# Patient Record
Sex: FEMALE | Race: WHITE | NOT HISPANIC OR LATINO | Employment: OTHER | URBAN - METROPOLITAN AREA
[De-identification: names, ages, dates, MRNs, and addresses within clinical notes are randomized per-mention and may not be internally consistent; named-entity substitution may affect disease eponyms.]

---

## 2019-02-08 NOTE — PATIENT DISCUSSION
Patient will need to have lid surgery before cataract surgery. When she is healed from lid surgery she will be custom toric Claudine [facility]. Patient advised that she will need to come back in for a decision for surgery and repeat measurements before she has surgery.

## 2019-02-08 NOTE — PATIENT DISCUSSION
The risks, benefits, and alternatives to surgery were discussed. The patient elects to proceed with surgery.

## 2019-02-20 NOTE — PATIENT DISCUSSION
Recommend Bilateral upper lid ptosis repair. Predeterm. Skin/fat to be determined. Skin does not affect MRDs. This will not meet insurance criteria. Skin/fat removal will be cosmetic. (discussed risks and benefits of sx. .. ).

## 2019-07-23 ENCOUNTER — NEW PATIENT EMERGENCY (OUTPATIENT)
Dept: URBAN - METROPOLITAN AREA CLINIC 35 | Facility: CLINIC | Age: 69
End: 2019-07-23

## 2019-07-23 DIAGNOSIS — H34.231: ICD-10-CM

## 2019-07-23 DIAGNOSIS — H40.033: ICD-10-CM

## 2019-07-23 DIAGNOSIS — H43.813: ICD-10-CM

## 2019-07-23 DIAGNOSIS — H25.13: ICD-10-CM

## 2019-07-23 PROCEDURE — 99203 OFFICE O/P NEW LOW 30 MIN: CPT

## 2019-07-23 PROCEDURE — 92250 FUNDUS PHOTOGRAPHY W/I&R: CPT

## 2019-07-23 PROCEDURE — 92134 CPTRZ OPH DX IMG PST SGM RTA: CPT

## 2019-07-23 ASSESSMENT — VISUAL ACUITY
OS_CC: 20/25
OD_CC: 20/25

## 2019-07-23 ASSESSMENT — TONOMETRY
OD_IOP_MMHG: 11
OS_IOP_MMHG: 12

## 2019-07-25 ENCOUNTER — RETINA CONSULT (OUTPATIENT)
Dept: URBAN - METROPOLITAN AREA CLINIC 35 | Facility: CLINIC | Age: 69
End: 2019-07-25

## 2019-07-25 DIAGNOSIS — H43.813: ICD-10-CM

## 2019-07-25 DIAGNOSIS — H35.3132: ICD-10-CM

## 2019-07-25 DIAGNOSIS — G45.3: ICD-10-CM

## 2019-07-25 DIAGNOSIS — H34.231: ICD-10-CM

## 2019-07-25 DIAGNOSIS — H35.363: ICD-10-CM

## 2019-07-25 DIAGNOSIS — H35.723: ICD-10-CM

## 2019-07-25 PROCEDURE — 92235 FLUORESCEIN ANGRPH MLTIFRAME: CPT

## 2019-07-25 PROCEDURE — 92250 FUNDUS PHOTOGRAPHY W/I&R: CPT

## 2019-07-25 PROCEDURE — 92014 COMPRE OPH EXAM EST PT 1/>: CPT

## 2019-07-25 PROCEDURE — 9222550 BILAT EXTENDED OPHTHALMOSCOPY, FIRST

## 2019-07-25 ASSESSMENT — VISUAL ACUITY
OD_SC: 20/25
OS_SC: 20/25

## 2019-07-25 ASSESSMENT — TONOMETRY
OS_IOP_MMHG: 13
OD_IOP_MMHG: 11

## 2019-08-13 ENCOUNTER — ESTABLISHED COMPREHENSIVE EXAM (OUTPATIENT)
Dept: URBAN - METROPOLITAN AREA CLINIC 35 | Facility: CLINIC | Age: 69
End: 2019-08-13

## 2019-08-13 DIAGNOSIS — H35.3132: ICD-10-CM

## 2019-08-13 DIAGNOSIS — H25.13: ICD-10-CM

## 2019-08-13 DIAGNOSIS — H04.123: ICD-10-CM

## 2019-08-13 DIAGNOSIS — H34.231: ICD-10-CM

## 2019-08-13 PROCEDURE — 92015 DETERMINE REFRACTIVE STATE: CPT

## 2019-08-13 PROCEDURE — 92012 INTRM OPH EXAM EST PATIENT: CPT

## 2019-08-13 ASSESSMENT — KERATOMETRY
OS_K1POWER_DIOPTERS: 44
OD_K2POWER_DIOPTERS: 44.75
OS_K2POWER_DIOPTERS: 45
OD_K1POWER_DIOPTERS: 43.75

## 2019-08-13 ASSESSMENT — VISUAL ACUITY
OS_CC: 20/25+1
OD_CC: J2
OD_CC: 20/20
OS_CC: J2

## 2019-08-13 ASSESSMENT — TONOMETRY
OD_IOP_MMHG: 11
OS_IOP_MMHG: 11

## 2019-08-29 ENCOUNTER — ESTABLISHED PATIENT (OUTPATIENT)
Dept: URBAN - METROPOLITAN AREA CLINIC 35 | Facility: CLINIC | Age: 69
End: 2019-08-29

## 2019-08-29 DIAGNOSIS — G45.3: ICD-10-CM

## 2019-08-29 DIAGNOSIS — H35.3132: ICD-10-CM

## 2019-08-29 DIAGNOSIS — H34.231: ICD-10-CM

## 2019-08-29 DIAGNOSIS — H35.363: ICD-10-CM

## 2019-08-29 DIAGNOSIS — H35.723: ICD-10-CM

## 2019-08-29 DIAGNOSIS — H43.813: ICD-10-CM

## 2019-08-29 PROCEDURE — 92012 INTRM OPH EXAM EST PATIENT: CPT

## 2019-08-29 PROCEDURE — 92134 CPTRZ OPH DX IMG PST SGM RTA: CPT

## 2019-08-29 ASSESSMENT — VISUAL ACUITY
OS_SC: 20/40-2
OD_SC: 20/50-1
OD_PH: 20/40
OS_PH: 20/25+2

## 2019-08-29 ASSESSMENT — KERATOMETRY
OS_K1POWER_DIOPTERS: 44
OD_K1POWER_DIOPTERS: 43.75
OS_K2POWER_DIOPTERS: 45
OD_K2POWER_DIOPTERS: 44.75

## 2019-08-29 ASSESSMENT — TONOMETRY
OD_IOP_MMHG: 12
OS_IOP_MMHG: 13

## 2020-01-09 ENCOUNTER — ESTABLISHED COMPREHENSIVE EXAM (OUTPATIENT)
Dept: URBAN - METROPOLITAN AREA CLINIC 35 | Facility: CLINIC | Age: 70
End: 2020-01-09

## 2020-01-09 VITALS — DIASTOLIC BLOOD PRESSURE: 70 MMHG | HEIGHT: 55 IN | SYSTOLIC BLOOD PRESSURE: 132 MMHG

## 2020-01-09 DIAGNOSIS — H35.363: ICD-10-CM

## 2020-01-09 DIAGNOSIS — G45.3: ICD-10-CM

## 2020-01-09 DIAGNOSIS — H35.3132: ICD-10-CM

## 2020-01-09 DIAGNOSIS — H43.813: ICD-10-CM

## 2020-01-09 DIAGNOSIS — H35.033: ICD-10-CM

## 2020-01-09 DIAGNOSIS — H34.231: ICD-10-CM

## 2020-01-09 DIAGNOSIS — H35.723: ICD-10-CM

## 2020-01-09 PROCEDURE — 92235 FLUORESCEIN ANGRPH MLTIFRAME: CPT

## 2020-01-09 PROCEDURE — 92014 COMPRE OPH EXAM EST PT 1/>: CPT

## 2020-01-09 PROCEDURE — 92250 FUNDUS PHOTOGRAPHY W/I&R: CPT

## 2020-01-09 PROCEDURE — 92201 OPSCPY EXTND RTA DRAW UNI/BI: CPT

## 2020-01-09 ASSESSMENT — VISUAL ACUITY
OS_SC: 20/40+2
OD_SC: 20/40

## 2020-01-09 ASSESSMENT — KERATOMETRY
OS_K1POWER_DIOPTERS: 44
OD_K2POWER_DIOPTERS: 44.75
OD_K1POWER_DIOPTERS: 43.75
OS_K2POWER_DIOPTERS: 45

## 2020-01-09 ASSESSMENT — TONOMETRY
OD_IOP_MMHG: 06
OS_IOP_MMHG: 09

## 2020-02-21 ENCOUNTER — TECH ONLY (OUTPATIENT)
Dept: URBAN - METROPOLITAN AREA CLINIC 35 | Facility: CLINIC | Age: 70
End: 2020-02-21

## 2020-02-21 DIAGNOSIS — Z79.899: ICD-10-CM

## 2020-02-21 PROCEDURE — 99211T TECH SERVICE

## 2020-02-21 PROCEDURE — 92083 EXTENDED VISUAL FIELD XM: CPT

## 2020-02-21 ASSESSMENT — KERATOMETRY
OD_K2POWER_DIOPTERS: 44.75
OS_K1POWER_DIOPTERS: 44
OS_K2POWER_DIOPTERS: 45
OD_K1POWER_DIOPTERS: 43.75

## 2020-09-10 ENCOUNTER — ESTABLISHED COMPREHENSIVE EXAM (OUTPATIENT)
Dept: URBAN - METROPOLITAN AREA CLINIC 35 | Facility: CLINIC | Age: 70
End: 2020-09-10

## 2020-09-10 DIAGNOSIS — H35.363: ICD-10-CM

## 2020-09-10 DIAGNOSIS — H35.3132: ICD-10-CM

## 2020-09-10 DIAGNOSIS — H43.813: ICD-10-CM

## 2020-09-10 DIAGNOSIS — H34.231: ICD-10-CM

## 2020-09-10 DIAGNOSIS — H35.723: ICD-10-CM

## 2020-09-10 DIAGNOSIS — H35.033: ICD-10-CM

## 2020-09-10 DIAGNOSIS — Z79.899: ICD-10-CM

## 2020-09-10 PROCEDURE — 92014 COMPRE OPH EXAM EST PT 1/>: CPT

## 2020-09-10 PROCEDURE — 92273 FULL FIELD ERG W/I&R: CPT

## 2020-09-10 PROCEDURE — 92250 FUNDUS PHOTOGRAPHY W/I&R: CPT

## 2020-09-10 ASSESSMENT — VISUAL ACUITY
OS_PH: 20/40-1
OD_SC: 20/40-2
OS_SC: 20/40-2
OD_PH: 20/40+2

## 2020-09-10 ASSESSMENT — TONOMETRY
OS_IOP_MMHG: 10
OD_IOP_MMHG: 11

## 2020-09-10 ASSESSMENT — KERATOMETRY
OD_K2POWER_DIOPTERS: 44.75
OS_K2POWER_DIOPTERS: 45
OD_K1POWER_DIOPTERS: 43.75
OS_K1POWER_DIOPTERS: 44

## 2021-03-12 ENCOUNTER — TECH ONLY (OUTPATIENT)
Dept: URBAN - METROPOLITAN AREA CLINIC 35 | Facility: CLINIC | Age: 71
End: 2021-03-12

## 2021-03-12 DIAGNOSIS — Z79.899: ICD-10-CM

## 2021-03-12 PROCEDURE — 92083 EXTENDED VISUAL FIELD XM: CPT

## 2021-03-12 PROCEDURE — 99211T TECH SERVICE

## 2021-05-06 ENCOUNTER — ESTABLISHED PATIENT (OUTPATIENT)
Dept: URBAN - METROPOLITAN AREA CLINIC 35 | Facility: CLINIC | Age: 71
End: 2021-05-06

## 2021-05-06 DIAGNOSIS — H34.231: ICD-10-CM

## 2021-05-06 DIAGNOSIS — H35.723: ICD-10-CM

## 2021-05-06 DIAGNOSIS — H35.363: ICD-10-CM

## 2021-05-06 DIAGNOSIS — D31.32: ICD-10-CM

## 2021-05-06 DIAGNOSIS — H35.033: ICD-10-CM

## 2021-05-06 DIAGNOSIS — H43.813: ICD-10-CM

## 2021-05-06 DIAGNOSIS — H35.3132: ICD-10-CM

## 2021-05-06 PROCEDURE — 92134 CPTRZ OPH DX IMG PST SGM RTA: CPT

## 2021-05-06 PROCEDURE — 99214 OFFICE O/P EST MOD 30 MIN: CPT

## 2021-05-06 PROCEDURE — 92250 FUNDUS PHOTOGRAPHY W/I&R: CPT

## 2021-05-06 ASSESSMENT — KERATOMETRY
OS_K2POWER_DIOPTERS: 45
OS_K1POWER_DIOPTERS: 44
OD_K2POWER_DIOPTERS: 44.75
OD_K1POWER_DIOPTERS: 43.75

## 2021-05-06 ASSESSMENT — TONOMETRY
OD_IOP_MMHG: 12
OS_IOP_MMHG: 11

## 2021-05-06 ASSESSMENT — VISUAL ACUITY
OD_CC: 20/25+1
OS_CC: 20/25-1

## 2021-06-03 ENCOUNTER — ESTABLISHED PATIENT (OUTPATIENT)
Dept: URBAN - METROPOLITAN AREA CLINIC 35 | Facility: CLINIC | Age: 71
End: 2021-06-03

## 2021-06-03 DIAGNOSIS — H35.723: ICD-10-CM

## 2021-06-03 DIAGNOSIS — H52.03: ICD-10-CM

## 2021-06-03 DIAGNOSIS — D31.32: ICD-10-CM

## 2021-06-03 DIAGNOSIS — H35.363: ICD-10-CM

## 2021-06-03 DIAGNOSIS — H10.13: ICD-10-CM

## 2021-06-03 DIAGNOSIS — H25.13: ICD-10-CM

## 2021-06-03 DIAGNOSIS — H34.231: ICD-10-CM

## 2021-06-03 DIAGNOSIS — Z79.899: ICD-10-CM

## 2021-06-03 DIAGNOSIS — H35.033: ICD-10-CM

## 2021-06-03 DIAGNOSIS — H35.3132: ICD-10-CM

## 2021-06-03 DIAGNOSIS — H43.813: ICD-10-CM

## 2021-06-03 PROCEDURE — 92014 COMPRE OPH EXAM EST PT 1/>: CPT

## 2021-06-03 PROCEDURE — 92015 DETERMINE REFRACTIVE STATE: CPT

## 2021-06-03 ASSESSMENT — VISUAL ACUITY
OD_CC: J1
OS_CC: J1
OS_CC: 20/25
OD_CC: 20/25-1

## 2021-06-03 ASSESSMENT — KERATOMETRY
OS_K2POWER_DIOPTERS: 44.75
OS_K1POWER_DIOPTERS: 43.75
OD_K1POWER_DIOPTERS: 43.75
OD_K2POWER_DIOPTERS: 44.50

## 2021-06-03 ASSESSMENT — TONOMETRY
OS_IOP_MMHG: 11
OD_IOP_MMHG: 11

## 2022-03-24 ASSESSMENT — KERATOMETRY
OD_K2POWER_DIOPTERS: 44.50
OS_K2POWER_DIOPTERS: 44.75
OD_K1POWER_DIOPTERS: 43.75
OS_K1POWER_DIOPTERS: 43.75

## 2022-04-01 ENCOUNTER — TECH ONLY (OUTPATIENT)
Dept: URBAN - METROPOLITAN AREA CLINIC 35 | Facility: CLINIC | Age: 72
End: 2022-04-01

## 2022-04-01 DIAGNOSIS — M32.10: ICD-10-CM

## 2022-04-01 DIAGNOSIS — Z79.899: ICD-10-CM

## 2022-04-01 PROCEDURE — 92083 EXTENDED VISUAL FIELD XM: CPT

## 2022-04-01 PROCEDURE — 99211T TECH SERVICE

## 2022-06-15 ASSESSMENT — KERATOMETRY
OD_K1POWER_DIOPTERS: 43.75
OS_K2POWER_DIOPTERS: 44.75
OS_K1POWER_DIOPTERS: 43.75
OD_K2POWER_DIOPTERS: 44.50

## 2022-06-16 ENCOUNTER — COMPREHENSIVE EXAM (OUTPATIENT)
Dept: URBAN - METROPOLITAN AREA CLINIC 35 | Facility: CLINIC | Age: 72
End: 2022-06-16

## 2022-06-16 DIAGNOSIS — M32.10: ICD-10-CM

## 2022-06-16 DIAGNOSIS — Z79.899: ICD-10-CM

## 2022-06-16 DIAGNOSIS — H25.13: ICD-10-CM

## 2022-06-16 DIAGNOSIS — H35.363: ICD-10-CM

## 2022-06-16 DIAGNOSIS — H43.813: ICD-10-CM

## 2022-06-16 DIAGNOSIS — H52.03: ICD-10-CM

## 2022-06-16 DIAGNOSIS — H35.3132: ICD-10-CM

## 2022-06-16 PROCEDURE — 92014 COMPRE OPH EXAM EST PT 1/>: CPT

## 2022-06-16 PROCEDURE — 92134 CPTRZ OPH DX IMG PST SGM RTA: CPT

## 2022-06-16 PROCEDURE — 92015 DETERMINE REFRACTIVE STATE: CPT

## 2022-06-16 ASSESSMENT — TONOMETRY
OS_IOP_MMHG: 15
OD_IOP_MMHG: 15

## 2022-06-16 ASSESSMENT — VISUAL ACUITY
OU_CC: J1
OD_CC: J3
OS_CC: J3
OS_CC: 20/25-2
OD_CC: 20/25

## 2023-04-12 ASSESSMENT — KERATOMETRY
OS_K2POWER_DIOPTERS: 44.75
OD_K2POWER_DIOPTERS: 44.50
OS_K1POWER_DIOPTERS: 43.75
OD_K1POWER_DIOPTERS: 43.75

## 2023-04-21 ENCOUNTER — TECH ONLY (OUTPATIENT)
Dept: URBAN - METROPOLITAN AREA CLINIC 35 | Facility: CLINIC | Age: 73
End: 2023-04-21

## 2023-04-21 DIAGNOSIS — Z79.899: ICD-10-CM

## 2023-04-21 DIAGNOSIS — M32.10: ICD-10-CM

## 2023-04-21 PROCEDURE — 92083 EXTENDED VISUAL FIELD XM: CPT

## 2023-04-21 PROCEDURE — 99211T TECH SERVICE

## 2023-10-31 ENCOUNTER — COMPREHENSIVE EXAM (OUTPATIENT)
Dept: URBAN - METROPOLITAN AREA CLINIC 35 | Facility: CLINIC | Age: 73
End: 2023-10-31

## 2023-10-31 DIAGNOSIS — H04.123: ICD-10-CM

## 2023-10-31 DIAGNOSIS — H35.033: ICD-10-CM

## 2023-10-31 DIAGNOSIS — H34.231: ICD-10-CM

## 2023-10-31 DIAGNOSIS — H35.363: ICD-10-CM

## 2023-10-31 DIAGNOSIS — H35.3132: ICD-10-CM

## 2023-10-31 DIAGNOSIS — H25.13: ICD-10-CM

## 2023-10-31 DIAGNOSIS — M32.10: ICD-10-CM

## 2023-10-31 DIAGNOSIS — H43.813: ICD-10-CM

## 2023-10-31 DIAGNOSIS — Z79.899: ICD-10-CM

## 2023-10-31 DIAGNOSIS — H40.033: ICD-10-CM

## 2023-10-31 DIAGNOSIS — H52.7: ICD-10-CM

## 2023-10-31 DIAGNOSIS — H35.30: ICD-10-CM

## 2023-10-31 PROCEDURE — 92015 DETERMINE REFRACTIVE STATE: CPT

## 2023-10-31 PROCEDURE — 92250 FUNDUS PHOTOGRAPHY W/I&R: CPT

## 2023-10-31 PROCEDURE — 99214 OFFICE O/P EST MOD 30 MIN: CPT

## 2023-10-31 ASSESSMENT — VISUAL ACUITY
OS_CC: 20/20-2
OS_CC: J1
OU_CC: J1
OD_CC: J2
OD_CC: 20/40-2
OD_PH: 20/25+1
OU_CC: 20/20-1

## 2023-10-31 ASSESSMENT — TONOMETRY
OD_IOP_MMHG: 14
OS_IOP_MMHG: 14

## 2024-05-03 ENCOUNTER — TECH ONLY (OUTPATIENT)
Dept: URBAN - METROPOLITAN AREA CLINIC 35 | Facility: CLINIC | Age: 74
End: 2024-05-03

## 2024-05-03 DIAGNOSIS — Z79.899: ICD-10-CM

## 2024-05-03 DIAGNOSIS — H52.7: ICD-10-CM

## 2024-05-03 PROCEDURE — 92083 EXTENDED VISUAL FIELD XM: CPT

## 2024-05-03 PROCEDURE — 99211T TECH SERVICE

## 2025-03-05 ENCOUNTER — COMPREHENSIVE EXAM (OUTPATIENT)
Age: 75
End: 2025-03-05

## 2025-03-05 DIAGNOSIS — H35.363: ICD-10-CM

## 2025-03-05 DIAGNOSIS — H04.123: ICD-10-CM

## 2025-03-05 DIAGNOSIS — H43.813: ICD-10-CM

## 2025-03-05 DIAGNOSIS — H34.231: ICD-10-CM

## 2025-03-05 DIAGNOSIS — H35.30: ICD-10-CM

## 2025-03-05 DIAGNOSIS — H52.7: ICD-10-CM

## 2025-03-05 DIAGNOSIS — Z79.899: ICD-10-CM

## 2025-03-05 DIAGNOSIS — H40.033: ICD-10-CM

## 2025-03-05 DIAGNOSIS — H25.13: ICD-10-CM

## 2025-03-05 DIAGNOSIS — H35.033: ICD-10-CM

## 2025-03-05 DIAGNOSIS — M32.10: ICD-10-CM

## 2025-03-05 DIAGNOSIS — H35.3132: ICD-10-CM

## 2025-03-05 PROCEDURE — 92134 CPTRZ OPH DX IMG PST SGM RTA: CPT

## 2025-03-05 PROCEDURE — 92015 DETERMINE REFRACTIVE STATE: CPT

## 2025-03-05 PROCEDURE — 99214 OFFICE O/P EST MOD 30 MIN: CPT

## 2025-03-05 PROCEDURE — 92250 FUNDUS PHOTOGRAPHY W/I&R: CPT

## 2025-06-03 ENCOUNTER — TECH ONLY (OUTPATIENT)
Age: 75
End: 2025-06-03

## 2025-06-03 DIAGNOSIS — H34.231: ICD-10-CM

## 2025-06-03 DIAGNOSIS — H52.7: ICD-10-CM

## 2025-06-03 DIAGNOSIS — M32.10: ICD-10-CM

## 2025-06-03 DIAGNOSIS — Z79.899: ICD-10-CM

## 2025-06-03 DIAGNOSIS — H25.13: ICD-10-CM

## 2025-06-03 DIAGNOSIS — H35.3132: ICD-10-CM

## 2025-06-03 PROCEDURE — 99211T TECH SERVICE

## 2025-06-03 PROCEDURE — 92083 EXTENDED VISUAL FIELD XM: CPT
